# Patient Record
Sex: FEMALE | Race: WHITE
[De-identification: names, ages, dates, MRNs, and addresses within clinical notes are randomized per-mention and may not be internally consistent; named-entity substitution may affect disease eponyms.]

---

## 2017-03-20 ENCOUNTER — TRANSCRIPTION ENCOUNTER (OUTPATIENT)
Age: 42
End: 2017-03-20

## 2021-01-14 ENCOUNTER — TRANSCRIPTION ENCOUNTER (OUTPATIENT)
Age: 46
End: 2021-01-14

## 2021-07-21 PROBLEM — Z00.00 ENCOUNTER FOR PREVENTIVE HEALTH EXAMINATION: Status: ACTIVE | Noted: 2021-07-21

## 2021-07-30 ENCOUNTER — NON-APPOINTMENT (OUTPATIENT)
Age: 46
End: 2021-07-30

## 2021-07-30 ENCOUNTER — APPOINTMENT (OUTPATIENT)
Dept: VASCULAR SURGERY | Facility: CLINIC | Age: 46
End: 2021-07-30
Payer: COMMERCIAL

## 2021-07-30 DIAGNOSIS — R22.41 LOCALIZED SWELLING, MASS AND LUMP, RIGHT LOWER LIMB: ICD-10-CM

## 2021-07-30 DIAGNOSIS — Z86.39 PERSONAL HISTORY OF OTHER ENDOCRINE, NUTRITIONAL AND METABOLIC DISEASE: ICD-10-CM

## 2021-07-30 DIAGNOSIS — Z86.79 PERSONAL HISTORY OF OTHER DISEASES OF THE CIRCULATORY SYSTEM: ICD-10-CM

## 2021-07-30 DIAGNOSIS — Z78.9 OTHER SPECIFIED HEALTH STATUS: ICD-10-CM

## 2021-07-30 PROCEDURE — 93971 EXTREMITY STUDY: CPT

## 2021-07-30 PROCEDURE — 99203 OFFICE O/P NEW LOW 30 MIN: CPT | Mod: 25

## 2021-08-03 PROBLEM — R22.41 LUMP OF SKIN OF RIGHT LOWER EXTREMITY: Status: ACTIVE | Noted: 2021-08-03

## 2021-08-03 PROBLEM — Z86.39 HISTORY OF HYPOTHYROIDISM: Status: RESOLVED | Noted: 2021-08-03 | Resolved: 2021-08-03

## 2021-08-03 PROBLEM — Z86.79 HISTORY OF VARICOSE VEINS OF LOWER EXTREMITY: Status: RESOLVED | Noted: 2021-08-03 | Resolved: 2021-08-03

## 2021-08-03 PROBLEM — Z78.9 NON-SMOKER: Status: ACTIVE | Noted: 2021-08-03

## 2021-08-03 RX ORDER — CABERGOLINE 0.5 MG/1
0.5 TABLET ORAL
Qty: 8 | Refills: 0 | Status: ACTIVE | COMMUNITY
Start: 2021-05-17

## 2021-08-03 RX ORDER — BUPROPION HYDROCHLORIDE 75 MG/1
75 TABLET, FILM COATED ORAL
Qty: 30 | Refills: 0 | Status: ACTIVE | COMMUNITY
Start: 2021-04-23

## 2021-08-03 RX ORDER — SPIRONOLACTONE 50 MG/1
50 TABLET ORAL
Qty: 90 | Refills: 0 | Status: ACTIVE | COMMUNITY
Start: 2021-02-22

## 2021-08-03 RX ORDER — LEVOTHYROXINE SODIUM 100 UG/1
100 TABLET ORAL
Qty: 60 | Refills: 0 | Status: ACTIVE | COMMUNITY
Start: 2021-02-22

## 2021-08-03 RX ORDER — LIOTHYRONINE SODIUM 5 UG/1
5 TABLET ORAL
Qty: 120 | Refills: 0 | Status: ACTIVE | COMMUNITY
Start: 2021-04-30

## 2021-08-03 NOTE — PROCEDURE
[FreeTextEntry1] : RLE venous US ordered today to  r/o DVT: Negative DVT/SVT. No reflux appreciated. VV in the calf. Anterior calf localized hypoechoic structure noted to the ant mid calf.

## 2021-08-03 NOTE — ASSESSMENT
[Arterial/Venous Disease] : arterial/venous disease [Medication Management] : medication management [FreeTextEntry1] : 45 y/o F w/venous varices and tortuous mass to the mid ant right calf. On exam, RLE: Bulging vein to the anterior shin. NT, Toes are warm to touch with adequate refill. Venous duplex to the right leg: Negative DVT/SVT. No reflux appreciated. VV in the calf. Anterior calf localized hypoechoic structure noted to the ant mid calf. \par \par Plan: \par - Complete CT scan of the right leg to further visualize and determine hypoechoic structure and determine further plan of care. \par - Will contact pt w/f/u recommendations once CT scans of the right leg are available. \par

## 2021-08-03 NOTE — HISTORY OF PRESENT ILLNESS
[FreeTextEntry1] : 45 y/o F never smoker presents to the office for initial consultation of varicose vein.  Patient reports having a bulging vein to her posterior R calf which has become bothersome over time. She states her venous varices bulge in the heat and especially as the day progresses. She states veins burn itch and interfere w/her daily life activities. She stays active exercising daily and feels her leg feels heavy after activities. She would like her veins to be addressed. Otherwise denies any skin breakdown, skin discoloration,  fever or chills.

## 2021-08-03 NOTE — PHYSICAL EXAM
[Normal Rate and Rhythm] : normal rate and rhythm [Respiratory Effort] : normal respiratory effort [Varicose Veins Of Lower Extremities] : present [Varicose Veins Of The Right Leg] : of the right leg [Ankle Swelling On The Left] : moderate [Alert] : alert [Oriented to Person] : oriented to person [Oriented to Place] : oriented to place [Oriented to Time] : oriented to time [Calm] : calm [Ankle Swelling (On Exam)] : not present [] : not present [Abdomen Tenderness] : ~T ~M No abdominal tenderness [de-identified] : Calm and cooperative  [de-identified] : NC/AT  [de-identified] : Supple  [de-identified] : FROM [de-identified] : RLE: Bulging vein to the anterior shin. NT, Toes are warm to touch with adequate capillary refill.

## 2021-08-03 NOTE — ADDENDUM
[FreeTextEntry1] : This note was written by Anali Snider on 07/30/2021 acting as scribe for Thony Shaw M.D.\par \par I, Dr.Vicken Lamb, personally performed the evaluation and management (E/M) services for this new patient.  That E/M includes conducting the initial examination, assessing all conditions, and establishing the plan of care.  Today, my ACP, THAI Vaughan, was here to observe my evaluation and management services for this patient to be followed going forward.\par \par \par \par \par

## 2021-08-13 ENCOUNTER — PROBLEM (OUTPATIENT)
Dept: URBAN - METROPOLITAN AREA CLINIC 92 | Facility: CLINIC | Age: 46
End: 2021-08-13

## 2021-08-13 DIAGNOSIS — H00.15: ICD-10-CM

## 2021-08-13 PROCEDURE — 92002 INTRM OPH EXAM NEW PATIENT: CPT | Mod: 25

## 2021-08-13 PROCEDURE — 67700 BLEPHAROTOMY DRG ABSC EYELID: CPT

## 2021-08-13 ASSESSMENT — TONOMETRY
OS_IOP_MMHG: 15
OD_IOP_MMHG: 15